# Patient Record
Sex: MALE | Race: WHITE
[De-identification: names, ages, dates, MRNs, and addresses within clinical notes are randomized per-mention and may not be internally consistent; named-entity substitution may affect disease eponyms.]

---

## 2023-02-27 ENCOUNTER — HOSPITAL ENCOUNTER (INPATIENT)
Dept: HOSPITAL 26 - MED | Age: 33
LOS: 2 days | Discharge: HOME | DRG: 134 | End: 2023-03-01
Attending: STUDENT IN AN ORGANIZED HEALTH CARE EDUCATION/TRAINING PROGRAM | Admitting: STUDENT IN AN ORGANIZED HEALTH CARE EDUCATION/TRAINING PROGRAM
Payer: MEDICAID

## 2023-02-27 VITALS — HEIGHT: 71 IN | WEIGHT: 210 LBS | BODY MASS INDEX: 29.4 KG/M2

## 2023-02-27 VITALS — DIASTOLIC BLOOD PRESSURE: 72 MMHG | SYSTOLIC BLOOD PRESSURE: 140 MMHG

## 2023-02-27 DIAGNOSIS — E83.51: ICD-10-CM

## 2023-02-27 DIAGNOSIS — R31.29: ICD-10-CM

## 2023-02-27 DIAGNOSIS — R73.9: ICD-10-CM

## 2023-02-27 DIAGNOSIS — N39.0: ICD-10-CM

## 2023-02-27 DIAGNOSIS — Z98.890: ICD-10-CM

## 2023-02-27 DIAGNOSIS — D64.9: ICD-10-CM

## 2023-02-27 DIAGNOSIS — Z20.822: ICD-10-CM

## 2023-02-27 DIAGNOSIS — I26.99: Primary | ICD-10-CM

## 2023-02-27 DIAGNOSIS — R80.9: ICD-10-CM

## 2023-02-27 LAB
ALBUMIN FLD-MCNC: 3.4 G/DL (ref 3.4–5)
ANION GAP SERPL CALCULATED.3IONS-SCNC: 9.1 MMOL/L (ref 8–16)
APPEARANCE UR: CLEAR
AST SERPL-CCNC: 50 U/L (ref 15–37)
BASOPHILS # BLD AUTO: 0.1 K/UL (ref 0–0.22)
BASOPHILS NFR BLD AUTO: 0.6 % (ref 0–2)
BILIRUB SERPL-MCNC: 1 MG/DL (ref 0–1)
BILIRUB UR QL STRIP: (no result)
BUN SERPL-MCNC: 16 MG/DL (ref 7–18)
CHLORIDE SERPL-SCNC: 102 MMOL/L (ref 98–107)
CO2 SERPL-SCNC: 29 MMOL/L (ref 21–32)
COLOR UR: YELLOW
CREAT SERPL-MCNC: 0.9 MG/DL (ref 0.6–1.3)
EOSINOPHIL # BLD AUTO: 0.2 K/UL (ref 0–0.4)
EOSINOPHIL NFR BLD AUTO: 2 % (ref 0–4)
ERYTHROCYTE [DISTWIDTH] IN BLOOD BY AUTOMATED COUNT: 18.9 % (ref 11.6–13.7)
GFR SERPL CREATININE-BSD FRML MDRD: 126 ML/MIN (ref 90–?)
GLUCOSE SERPL-MCNC: 112 MG/DL (ref 74–106)
GLUCOSE UR STRIP-MCNC: (no result) MG/DL
HCT VFR BLD AUTO: 26.5 % (ref 36–52)
HGB BLD-MCNC: 8.8 G/DL (ref 12–18)
HGB UR QL STRIP: (no result)
LEUKOCYTE ESTERASE UR QL STRIP: (no result)
LYMPHOCYTES # BLD AUTO: 2.3 K/UL (ref 2–11.5)
LYMPHOCYTES NFR BLD AUTO: 25.8 % (ref 20.5–51.1)
MCH RBC QN AUTO: 33 PG (ref 27–31)
MCHC RBC AUTO-ENTMCNC: 33 G/DL (ref 33–37)
MCV RBC AUTO: 98 FL (ref 80–94)
MONOCYTES # BLD AUTO: 0.7 K/UL (ref 0.8–1)
MONOCYTES NFR BLD AUTO: 8.2 % (ref 1.7–9.3)
NEUTROPHILS # BLD AUTO: 5.7 K/UL (ref 1.8–7.7)
NEUTROPHILS NFR BLD AUTO: 63.4 % (ref 42.2–75.2)
NITRITE UR QL STRIP: POSITIVE
PH UR STRIP: 5.5 [PH] (ref 5–9)
PLATELET # BLD AUTO: 297 K/UL (ref 140–450)
POTASSIUM SERPL-SCNC: 4.1 MMOL/L (ref 3.5–5.1)
PROTHROMBIN TIME: 9.2 SECS (ref 10.8–13.4)
RBC # BLD AUTO: 2.7 MIL/UL (ref 4.2–6.1)
RBC #/AREA URNS HPF: (no result) /HPF (ref 0–5)
SODIUM SERPL-SCNC: 136 MMOL/L (ref 136–145)
WBC # BLD AUTO: 9 K/UL (ref 4.8–10.8)
WBC,URINE: (no result) /HPF (ref 0–5)
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
## 2023-02-28 VITALS — DIASTOLIC BLOOD PRESSURE: 81 MMHG | SYSTOLIC BLOOD PRESSURE: 128 MMHG

## 2023-02-28 VITALS — SYSTOLIC BLOOD PRESSURE: 149 MMHG | DIASTOLIC BLOOD PRESSURE: 69 MMHG

## 2023-02-28 VITALS — DIASTOLIC BLOOD PRESSURE: 69 MMHG | SYSTOLIC BLOOD PRESSURE: 123 MMHG

## 2023-02-28 VITALS — SYSTOLIC BLOOD PRESSURE: 118 MMHG | DIASTOLIC BLOOD PRESSURE: 78 MMHG

## 2023-02-28 VITALS — SYSTOLIC BLOOD PRESSURE: 125 MMHG | DIASTOLIC BLOOD PRESSURE: 77 MMHG

## 2023-02-28 VITALS — SYSTOLIC BLOOD PRESSURE: 125 MMHG | DIASTOLIC BLOOD PRESSURE: 66 MMHG

## 2023-02-28 LAB
ANION GAP SERPL CALCULATED.3IONS-SCNC: 11.4 MMOL/L (ref 8–16)
BASOPHILS # BLD AUTO: 0.1 K/UL (ref 0–0.22)
BASOPHILS NFR BLD AUTO: 0.7 % (ref 0–2)
BUN SERPL-MCNC: 13 MG/DL (ref 7–18)
CHLORIDE SERPL-SCNC: 103 MMOL/L (ref 98–107)
CO2 SERPL-SCNC: 26.5 MMOL/L (ref 21–32)
CREAT SERPL-MCNC: 0.9 MG/DL (ref 0.6–1.3)
EOSINOPHIL # BLD AUTO: 0.2 K/UL (ref 0–0.4)
EOSINOPHIL NFR BLD AUTO: 2.1 % (ref 0–4)
ERYTHROCYTE [DISTWIDTH] IN BLOOD BY AUTOMATED COUNT: 18.6 % (ref 11.6–13.7)
GFR SERPL CREATININE-BSD FRML MDRD: 126 ML/MIN (ref 90–?)
GLUCOSE SERPL-MCNC: 121 MG/DL (ref 74–106)
HCT VFR BLD AUTO: 25.4 % (ref 36–52)
HGB BLD-MCNC: 8.4 G/DL (ref 12–18)
LYMPHOCYTES # BLD AUTO: 3.1 K/UL (ref 2–11.5)
LYMPHOCYTES NFR BLD AUTO: 32.6 % (ref 20.5–51.1)
MCH RBC QN AUTO: 32 PG (ref 27–31)
MCHC RBC AUTO-ENTMCNC: 33 G/DL (ref 33–37)
MCV RBC AUTO: 97.3 FL (ref 80–94)
MONOCYTES # BLD AUTO: 0.6 K/UL (ref 0.8–1)
MONOCYTES NFR BLD AUTO: 6.6 % (ref 1.7–9.3)
NEUTROPHILS # BLD AUTO: 5.5 K/UL (ref 1.8–7.7)
NEUTROPHILS NFR BLD AUTO: 58 % (ref 42.2–75.2)
PLATELET # BLD AUTO: 292 K/UL (ref 140–450)
POTASSIUM SERPL-SCNC: 3.9 MMOL/L (ref 3.5–5.1)
PROTHROMBIN TIME: 9.5 SECS (ref 10.8–13.4)
RBC # BLD AUTO: 2.61 MIL/UL (ref 4.2–6.1)
SODIUM SERPL-SCNC: 137 MMOL/L (ref 136–145)
WBC # BLD AUTO: 9.6 K/UL (ref 4.8–10.8)

## 2023-02-28 RX ADMIN — ACETAMINOPHEN PRN MG: 325 TABLET ORAL at 19:01

## 2023-02-28 RX ADMIN — HEPARIN SODIUM SCH MLS/HR: 5000 INJECTION, SOLUTION INTRAVENOUS at 11:52

## 2023-02-28 RX ADMIN — HEPARIN SODIUM SCH MLS/HR: 5000 INJECTION, SOLUTION INTRAVENOUS at 00:07

## 2023-02-28 RX ADMIN — HEPARIN SODIUM SCH MLS/HR: 5000 INJECTION, SOLUTION INTRAVENOUS at 14:40

## 2023-02-28 RX ADMIN — ACETAMINOPHEN PRN MG: 325 TABLET ORAL at 12:03

## 2023-02-28 NOTE — NUR
AMBIEN WAS GIVEN TO PT AS PER PT REQUEST. PT DENIES PAIN AND NO S/S OF RESPIRATORY DISTRESS 
NOTED. ALL SAFETY MEASURES IMPLEMENTED. BED IN LOW POSITION, BED WHEELS ON LOCKED AND CALL 
LIGHT WITHIN REACH.

## 2023-02-28 NOTE — NUR
PATIENT HAS BEEN SCREENED AND CATEGORIZED AS MODERATE NUTRITION RISK. PATIENT WILL BE SEEN 
WITHIN 3-5 DAYS OF ADMISSION.



3/2/233/4/23



REVIEWED BY FARHANA SOLIS RD

## 2023-02-28 NOTE — NUR
RECEIVED PT AS A NEW ADMIT FROM ER. PATIENT IS AWAKE,ALERT AND ORIENTED. DENIES PAIN. NO 
ACUTE RESPIRATORY DISTRESS NOTED. HEPARIN DRIP AT 1710 UNITS/ HR INFUSING WELL IN THE RIGHT 
AC. SKIN WARM AND DRY TO TOUCH. ADMISSION ASSESSMENT DONE AND DOCUMENTED. SAFETY PRECAUTIONS 
IN PLACE, CALL LIGHT IN REACH, ENCOURAGED TO CALL IF ASSISTANCE IS NEEDED, PT VERBALLY 
ACKNOWLEDGED.

## 2023-02-28 NOTE — NUR
RECEIVED PT FROM MORNING SHIFT NURSE. PT IS AOXX4, AMBULATORY WITH WALKER, AOX4, ABLE TO 
VERBALIZE NEEDS AND ABLE TO FOLLOW COMMANDS. PT IS ON ROOM AIR AND ON REGULAR DIET. PT HAS 
IV ON RIGHT AC GAUGE 20 RUNNING WITH HEPARIN AT 1900 UNITS. PT HAS RIGHT FEMUR WOUND SURGERY 
WITH STAPLES. NO COMPLAIN OF PAIN AT THIS TIME AND NO S/S OF RESPIRATORY DISTRESS NOTED. ALL 
SAFETY MEASURES IMPLEMENTED. BED IN LOW POSITION, BED WHEELS ON LOCKED AND CALL LIGHT WITHIN 
REACH.

## 2023-02-28 NOTE — NUR
MD NOTIFIED OF PT SLIGHT HEMATURIA IN URINAL, MD STATED TO CONTINUE TO MONITOR FOR NOW. IF 
CONTINUE THEN TO CONSULT UROLOGY.

## 2023-02-28 NOTE — NUR
SCHEDULED AND PRESCRIBED MEDICATION WAS GIVEN TO PT AS PER MD ORDER. NO COMPLAIN OF PAIN AT 
THIS TIME AND NO S/S OF RESPIRATORY DISTRESS NOTED. ALL SAFETY MEASURES IMPLEMENTED. BED IN 
LOW POSITION, BED WHEELS ON LOCKED AND CALL LIGHT WITHIN REACH.

## 2023-02-28 NOTE — NUR
CALLED LAB REGARDING IMPORTANCE OF KEEPING THE TIME FOR THE PTT, STATED THEY ARE ON THEIR 
WAY TO DRAW BLOOD.

## 2023-02-28 NOTE — NUR
CALLED LAB REGARDING PTT STILL PENDING, LAB STATED THE MACHINE IS DOWN AND THEIR WORKING AS 
QUICKLY AS THEY CAN.

## 2023-02-28 NOTE — NUR
PATIENT IS ASLEEP. NO DISTRESS NOTED. ALL NEEDS ATTENDED TO. SAFETY PRECAUTIONS MAINTAINED 
DURING THE SHIFT, CALL LIGHT REMAINED WITHIN REACH.

## 2023-02-28 NOTE — NUR
Patient will be admitted to care of pulmonary embolism . Admited to telemery.  
Will go to room 111a. Belongings list completed.  Report to chinedu

## 2023-02-28 NOTE — NUR
INSERTED NEW IV ON LEFT HAND GAUGE 22. IV IS PATENT AND INTACT. ALL SAFETY MEASURES 
IMPLEMENTED. BED IN LOW POSITION, BED WHEELS ON LOCKED AND CALL LIGHT WITHIN REACH.

## 2023-02-28 NOTE — NUR
RECEIVED REPORT FROM NIGHT SHIFT NURSE FOR CONTINUITY OF CARE, POC DISCUSSED. PT IS RESTING 
IN BED ON ROOM AIR WITH CHEST RISING AND FALLING EVEN AND UNLABORED. PT ON HEP DRIP AT 1710, 
PTT WAS DRAWN AT 0605, PENDING RESULTS FOR ANY CHANGES. PT ON TELE. ALL SAFETY MEASURES IN 
PLACE, CALL LIGHT WITHIN REACH.

## 2023-02-28 NOTE — NUR
PTT RESULTS 44.1. BOLUS AND INCREASE RATE PER PHARMACY PROTOCOL. DISCUSSED WITH PHARMACIST 
CHELSEA. EDUCATION PROVIDED TO PT AND PTS GIRLFRIEND AT BEDSIDE, STATED THEY UNDERSTOOD. TORB 
REGARDING REMOVAL OF MACIAS, MACIAS SUCCESSFULLY REMOVED CATH IN PLACE. PT TOLERATED REMOVAL.

## 2023-03-01 VITALS — DIASTOLIC BLOOD PRESSURE: 83 MMHG | SYSTOLIC BLOOD PRESSURE: 115 MMHG

## 2023-03-01 VITALS — DIASTOLIC BLOOD PRESSURE: 65 MMHG | SYSTOLIC BLOOD PRESSURE: 120 MMHG

## 2023-03-01 VITALS — SYSTOLIC BLOOD PRESSURE: 125 MMHG | DIASTOLIC BLOOD PRESSURE: 78 MMHG

## 2023-03-01 VITALS — DIASTOLIC BLOOD PRESSURE: 64 MMHG | SYSTOLIC BLOOD PRESSURE: 119 MMHG

## 2023-03-01 LAB
ANION GAP SERPL CALCULATED.3IONS-SCNC: 10.5 MMOL/L (ref 8–16)
BASOPHILS # BLD AUTO: 0.1 K/UL (ref 0–0.22)
BASOPHILS NFR BLD AUTO: 0.7 % (ref 0–2)
BUN SERPL-MCNC: 11 MG/DL (ref 7–18)
CHLORIDE SERPL-SCNC: 103 MMOL/L (ref 98–107)
CO2 SERPL-SCNC: 26.5 MMOL/L (ref 21–32)
CREAT SERPL-MCNC: 0.9 MG/DL (ref 0.6–1.3)
EOSINOPHIL # BLD AUTO: 0.2 K/UL (ref 0–0.4)
EOSINOPHIL NFR BLD AUTO: 1.9 % (ref 0–4)
ERYTHROCYTE [DISTWIDTH] IN BLOOD BY AUTOMATED COUNT: 18.4 % (ref 11.6–13.7)
GFR SERPL CREATININE-BSD FRML MDRD: 126 ML/MIN (ref 90–?)
GLUCOSE SERPL-MCNC: 114 MG/DL (ref 74–106)
HCT VFR BLD AUTO: 27.4 % (ref 36–52)
HGB BLD-MCNC: 9.1 G/DL (ref 12–18)
LYMPHOCYTES # BLD AUTO: 3.1 K/UL (ref 2–11.5)
LYMPHOCYTES NFR BLD AUTO: 30.1 % (ref 20.5–51.1)
MCH RBC QN AUTO: 32 PG (ref 27–31)
MCHC RBC AUTO-ENTMCNC: 33 G/DL (ref 33–37)
MCV RBC AUTO: 97.1 FL (ref 80–94)
MONOCYTES # BLD AUTO: 0.8 K/UL (ref 0.8–1)
MONOCYTES NFR BLD AUTO: 7.8 % (ref 1.7–9.3)
NEUTROPHILS # BLD AUTO: 6 K/UL (ref 1.8–7.7)
NEUTROPHILS NFR BLD AUTO: 59.5 % (ref 42.2–75.2)
PLATELET # BLD AUTO: 322 K/UL (ref 140–450)
POTASSIUM SERPL-SCNC: 4 MMOL/L (ref 3.5–5.1)
RBC # BLD AUTO: 2.83 MIL/UL (ref 4.2–6.1)
SODIUM SERPL-SCNC: 136 MMOL/L (ref 136–145)
WBC # BLD AUTO: 10.1 K/UL (ref 4.8–10.8)

## 2023-03-01 RX ADMIN — ACETAMINOPHEN PRN MG: 325 TABLET ORAL at 02:34

## 2023-03-01 RX ADMIN — HEPARIN SODIUM SCH MLS/HR: 5000 INJECTION, SOLUTION INTRAVENOUS at 06:25

## 2023-03-01 NOTE — NUR
PT IS SLEEPING. CHEST RISE AND FALL SYMMETRICALLY NOTED. RESPIRATION IS EVEN AND UNLABORED. 
ALL SAFETY MEASURES IMPLEMENTED. BED IN LOW POSITION, BED WHEELS ON LOCKED AND CALL LIGHT 
WITHIN REACH.

## 2023-03-01 NOTE — NUR
CHECKED THE PT, STILL SLEEPING. CHEST RISE AND FALL SYMMETRICALLY NOTED. RESPIRATION IS EVEN 
AND UNLABORED. ALL SAFETY MEASURES IMPLEMENTED. BED IN LOW POSITION, BED WHEELS ON LOCK AND 
CALL LIGHT WITHIN REACH.

## 2023-03-01 NOTE — NUR
pt. admitted with right hip surgical wound 3 sites, staples inplace, dressing dci. inform pt 
continue to f/u with surgeon. per pt. 3/8/2023 will be the appointment.